# Patient Record
Sex: MALE | Race: WHITE
[De-identification: names, ages, dates, MRNs, and addresses within clinical notes are randomized per-mention and may not be internally consistent; named-entity substitution may affect disease eponyms.]

---

## 2018-12-30 ENCOUNTER — HOSPITAL ENCOUNTER (EMERGENCY)
Dept: HOSPITAL 10 - FTE | Age: 23
Discharge: HOME | End: 2018-12-30
Payer: COMMERCIAL

## 2018-12-30 VITALS — RESPIRATION RATE: 17 BRPM | DIASTOLIC BLOOD PRESSURE: 63 MMHG | SYSTOLIC BLOOD PRESSURE: 117 MMHG | HEART RATE: 76 BPM

## 2018-12-30 VITALS — BODY MASS INDEX: 31.6 KG/M2 | HEIGHT: 60 IN | WEIGHT: 160.94 LBS

## 2018-12-30 DIAGNOSIS — R05: Primary | ICD-10-CM

## 2018-12-30 PROCEDURE — 71045 X-RAY EXAM CHEST 1 VIEW: CPT

## 2018-12-30 NOTE — ERD
ER Documentation


Chief Complaint


Chief Complaint





bib self, cc: cough





HPI


23-year-old female patient with no significant past medical history presents to 


the ED stating that he has a dry cough and slight shortness of breath. Reports 


that this has been going on intermittently for the last month.  Denies taking 


any medications.  Denies any recent traveling.  Denies any wheezing, fever, 


chills, nausea, vomiting, diarrhea, chest pain, dyspnea on exertion, pleuritic 


chest pain.





ROS


All systems reviewed and are negative except as per history of present illness.





Medications


Home Meds


Active Scripts


Albuterol Sulfate* (Ventolin HFA*) 18 Gm Hfa.aer.ad, 2 PUFF INHALATION Q6H, #1 


INHALER


   Prov:AICHA LEARY PA-C         12/30/18


Benzonatate* (Tessalon Perle*) 100 Mg Capsule, 100 MG PO Q8H PRN for COUGH, #20 


CAP


   Prov:AICHA LERAY PA-C         12/30/18





Allergies


Allergies:  


Coded Allergies:  


     No Known Allergy (Unverified , 12/30/18)





PMhx/Soc


Medical and Surgical Hx:  pt denies Medical Hx, pt denies Surgical Hx


Hx Alcohol Use:  No


Hx Substance Use:  No


Hx Tobacco Use:  No


Smoking Status:  Never smoker





FmHx


Family History:  No diabetes, No coronary disease





Physical Exam


Vitals





Vital Signs


  Date      Temp  Pulse  Resp  B/P (MAP)   Pulse Ox  O2          O2 Flow    FiO2


Time                                                 Delivery    Rate


  12/30/18  98.8     75    19      120/59       100


     13:43                           (79)





Physical Exam


Const: Non-ill-appearing, well-nourished. In no acute distress.


Head: Atraumatic, normocephalic 


Eyes: Normal Conjunctiva without injection. No purulent discharge. PERRL. EOMI 


ENT: Normal external ear. Ear canal without erythema. Tympanic membrane pearly 


gray without effusion or bulging. Nasal canal clear with normal turbinates. 


Moist oropharynx without tonsillar exudates. Non-erythematous pharynx. Uvula 


midline. No drooling.  No trismus. 


Neck: Full range of motion. No meningismus. No cervical lymphadenopathy. 


Resp: Clear to auscultation bilaterally. No wheezing, rhonchi, rales, or cr


ackles. No accessory muscle use. No retractions.


Cardio: Regular rate and rhythm.  No murmurs, rubs or gallops.


Abd: Soft, non tender, non distended. Normal bowel sounds.  No palpable masses. 


No rebound tenderness.  No guarding.  


Skin: No petechiae or rashes


Back: No midline tenderness. No CVA tenderness.


Ext: No cyanosis, or edema. 


Neur: Awake and alert. 


Psych: Normal Mood and Affect





Procedures/MDM


33-year-old male patient with no significant past medical history presents to 


the ED complaining of a dry cough that started intermittently for 1 month.  


Patient is afebrile and nontoxic-appearing.  Chest x-ray was ordered to further 


evaluate patient.  Chest x-ray is negative for any pneumonia, pneumothorax, 


pleural effusion. 





This patient presents to the ED with symptoms consistent with a viral acute 


upper respiratory infection.  Patient's physical exam include lungs which were 


clear to auscultation and a normal pulse oximetry. There is a low suspicion for 


pneumonia, pneumothorax, mononucleosis, pulmonary embolism, epiglottitis, otitis


media, otitis externa, viral/strep pharyngitis, sinusitis, myocarditis, peric


arditis, endocarditis, peritonsillar abscess, mastoiditis, retropharyngeal 


abscess, meningitis, sepsis, acute abdomen or other emergent conditions. Fluids,


rest, and symptomatic treatment are recommended for the management of patient's 


symptoms.





Diagnosis: Cough


Discharge medications: Ventolin, Tessalon Perles


Patient was instructed to return to the ED for any new or worsening symptoms. 


They should otherwise follow up with the primary care provider within 2-3 days. 


The patient's questions were answered at the time of discharge. Patient 


understood and agreed with discharge management. 





Disclaimer: Inadvertent spelling and grammatical errors are likely due to 


EHR/dictation software use and do not reflect on the overall quality of patient 


care. Also, please note that the electronic time recorded on this note does not 


necessarily reflect the actual time of the patient encounter.





Departure


Diagnosis:  


   Primary Impression:  


   Cough


Condition:  Stable


Patient Instructions:  Uri, Viral, No Abx (Adult)


Referrals:  


COMM[facility]ITY CL[facility]ICS


YOU HAVE RECEIVED A MEDICAL SCREENING EXAM AND THE RESULTS INDICATE THAT YOU DO 


NOT HAVE A CONDITION THAT REQUIRES URGENT TREATMENT IN THE EMERGENCY DEPARTMENT.





FURTHER EVALUATION AND TREATMENT OF YOUR CONDITION CAN WAIT UNTIL YOU ARE SEEN 


IN YOUR DOCTORS OFFICE WITHIN THE NEXT 1-2 DAYS. IT IS YOUR RESPONSIBILITY TO 


MAKE AN APPOINTMENT FOR FOLOW-UP CARE.





IF YOU HAVE A PRIMARY DOCTOR


--you should call your primary doctor and schedule an appointment





IF YOU DO NOT HAVE A PRIMARY DOCTOR YOU CAN CALL OUR PHYSICIAN REFERRAL HOTLINE 


AT


 [phone] 





IF YOU CAN NOT AFFORD TO SEE A PHYSICIAN YOU CAN CHOSE FROM THE FOLLOWING [facility] [phone] [address] [facility]





[facility] [phone] [address] [facility]





[facility] [phone] [address] [facility] [phone] [address] [facility] [phone] [address] [facility]. [facility] [phone] [address]. [facility]


YOU HAVE RECEIVED A MEDICAL SCREENING EXAM AND THE RESULTS INDICATE THAT YOU DO 


NOT HAVE A CONDITION THAT REQUIRES URGENT TREATMENT IN THE EMERGENCY DEPARTMENT.





FURTHER EVALUATION AND TREATMENT OF YOUR CONDITION CAN WAIT UNTIL YOU ARE SEEN 


IN YOUR DOCTORS OFFICE WITHIN THE NEXT 1-2 DAYS. IT IS YOUR RESPONSIBILITY TO 


MAKE AN APPOINTMENT FOR FOLOW-UP CARE.





IF YOU HAVE A PRIMARY DOCTOR


--you should call your primary doctor and schedule and appointment





IF YOU DO NOT HAVE A PRIMARY DOCTOR YOU CAN CALL OUR PHYSICIAN REFERRAL HOTLINE 


AT [phone].





IF YOU CAN NOT AFFORD TO SEE A PHYSICIAN YOU CAN CHOSE FROM THE FOLLOWING [facility]:





[facility]


[address] [place] [address]





[facility]


[address] [place] [address]





[facility]


[address] [place] [address]





[facility] URGENT CARE/SPECIALTIES





Additional Instructions:  


Call your primary care doctor TOMORROW for an appointment during the next 2-3 


days.See the doctor sooner or return here if your condition worsens before your 


appointment time.











AICHA LEARY PA-C              Dec 30, 2018 15:38